# Patient Record
Sex: MALE | Race: WHITE | NOT HISPANIC OR LATINO | Employment: STUDENT | URBAN - METROPOLITAN AREA
[De-identification: names, ages, dates, MRNs, and addresses within clinical notes are randomized per-mention and may not be internally consistent; named-entity substitution may affect disease eponyms.]

---
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
## 2020-09-06 ENCOUNTER — HOSPITAL ENCOUNTER (EMERGENCY)
Facility: HOSPITAL | Age: 20
Discharge: HOME | End: 2020-09-06
Attending: EMERGENCY MEDICINE | Admitting: EMERGENCY MEDICINE
Payer: COMMERCIAL

## 2020-09-06 ENCOUNTER — APPOINTMENT (EMERGENCY)
Dept: RADIOLOGY | Facility: HOSPITAL | Age: 20
End: 2020-09-06
Payer: COMMERCIAL

## 2020-09-06 VITALS
WEIGHT: 218.92 LBS | SYSTOLIC BLOOD PRESSURE: 153 MMHG | RESPIRATION RATE: 18 BRPM | OXYGEN SATURATION: 99 % | HEART RATE: 79 BPM | DIASTOLIC BLOOD PRESSURE: 74 MMHG | TEMPERATURE: 98.4 F

## 2020-09-06 DIAGNOSIS — J06.9 URI (UPPER RESPIRATORY INFECTION): Primary | ICD-10-CM

## 2020-09-06 PROCEDURE — U0003 INFECTIOUS AGENT DETECTION BY NUCLEIC ACID (DNA OR RNA); SEVERE ACUTE RESPIRATORY SYNDROME CORONAVIRUS 2 (SARS-COV-2) (CORONAVIRUS DISEASE [COVID-19]), AMPLIFIED PROBE TECHNIQUE, MAKING USE OF HIGH THROUGHPUT TECHNOLOGIES AS DESCRIBED BY CMS-2020-01-R: HCPCS | Performed by: PHYSICIAN ASSISTANT

## 2020-09-06 PROCEDURE — 99285 EMERGENCY DEPT VISIT HI MDM: CPT

## 2020-09-06 PROCEDURE — 71045 X-RAY EXAM CHEST 1 VIEW: CPT

## 2020-09-06 PROCEDURE — 94640 AIRWAY INHALATION TREATMENT: CPT

## 2020-09-06 PROCEDURE — 99284 EMERGENCY DEPT VISIT MOD MDM: CPT | Performed by: PHYSICIAN ASSISTANT

## 2020-09-06 RX ORDER — LORATADINE 10 MG/1
10 TABLET ORAL DAILY
Qty: 7 TABLET | Refills: 0 | Status: SHIPPED | OUTPATIENT
Start: 2020-09-06 | End: 2020-09-13

## 2020-09-06 RX ORDER — LEVOTHYROXINE SODIUM 0.05 MG/1
50 TABLET ORAL DAILY
COMMUNITY

## 2020-09-06 RX ORDER — LORATADINE 10 MG/1
10 TABLET ORAL ONCE
Status: COMPLETED | OUTPATIENT
Start: 2020-09-06 | End: 2020-09-06

## 2020-09-06 RX ORDER — IPRATROPIUM BROMIDE AND ALBUTEROL SULFATE 2.5; .5 MG/3ML; MG/3ML
3 SOLUTION RESPIRATORY (INHALATION)
Status: DISCONTINUED | OUTPATIENT
Start: 2020-09-06 | End: 2020-09-07 | Stop reason: HOSPADM

## 2020-09-06 RX ORDER — IBUPROFEN 400 MG/1
400 TABLET ORAL EVERY 6 HOURS PRN
Qty: 16 TABLET | Refills: 0 | Status: SHIPPED | OUTPATIENT
Start: 2020-09-06 | End: 2020-09-10

## 2020-09-06 RX ORDER — OXYMETAZOLINE HYDROCHLORIDE 0.05 G/100ML
2 SPRAY NASAL ONCE
Status: COMPLETED | OUTPATIENT
Start: 2020-09-06 | End: 2020-09-06

## 2020-09-06 RX ORDER — IBUPROFEN 400 MG/1
400 TABLET ORAL ONCE
Status: COMPLETED | OUTPATIENT
Start: 2020-09-06 | End: 2020-09-06

## 2020-09-06 RX ORDER — ALBUTEROL SULFATE 90 UG/1
2 AEROSOL, METERED RESPIRATORY (INHALATION) ONCE
Status: DISCONTINUED | OUTPATIENT
Start: 2020-09-06 | End: 2020-09-07 | Stop reason: HOSPADM

## 2020-09-06 RX ADMIN — OXYMETAZOLINE HYDROCHLORIDE 2 SPRAY: 0.05 SPRAY NASAL at 21:51

## 2020-09-06 RX ADMIN — IPRATROPIUM BROMIDE AND ALBUTEROL SULFATE 3 ML: 2.5; .5 SOLUTION RESPIRATORY (INHALATION) at 21:51

## 2020-09-06 RX ADMIN — LORATADINE 10 MG: 10 TABLET ORAL at 21:51

## 2020-09-06 RX ADMIN — IBUPROFEN 400 MG: 400 TABLET ORAL at 21:51

## 2020-09-06 NOTE — Clinical Note
Santy Greta was seen and treated in our emergency department on 9/6/2020  Diagnosis:     Deandre Gilliam  may return to work on return date  He may return on this date: 09/09/2020         If you have any questions or concerns, please don't hesitate to call        Verner Lao, RN    ______________________________           _______________          _______________  Hospital Representative                              Date                                Time

## 2020-09-07 NOTE — DISCHARGE INSTRUCTIONS
Take Motrin, Claritin as indicated  Use Afrin as indicated; 1 spray each naris every 12 hours no longer than 3 days  Perform daily humidifiers  Follow-up with PCP  Follow up emergency department symptoms persist or exacerbate

## 2020-09-07 NOTE — ED PROVIDER NOTES
History  Chief Complaint   Patient presents with    Shortness of Breath     Pt presents to the ED with intermittent episodes of sob and cough throughout the day yesterday  C/o of congestion and dizziness  Patient is a 70-year-old male with no significant past medical surgical history that presents emergency department with intermittent hacking nonproductive cough for 1 day  Patient states that his associated symptomatology of nasal congestion beginning with current ED presentation of cough  Patient also verbalizes a history of tracheomalacia and has been seen by PCP in the past for aforementioned  Patient denies palliative factors with provocative factors of lying flat  Patient affirms not effective treatment of over-the-counter medications  Patient denies fevers, chills, nausea, vomiting, diarrhea, constipation urinary symptoms  Patient denies recent fall or recent trauma  Patient denies sick contacts or recent travel  Patient denies history of DVT, PE, thrombophlebitis  Patient denies recent contact with COVID positive persons  Patient denies chest pain, shortness of breath, and abdominal pain        History provided by:  Patient   used: No    Cough   Cough characteristics:  Non-productive  Sputum characteristics:  Nondescript  Severity:  Mild  Onset quality:  Gradual  Duration:  1 day  Timing:  Intermittent  Progression:  Unchanged  Chronicity:  New  Smoker: yes    Context: not animal exposure, not exposure to allergens, not sick contacts, not smoke exposure, not upper respiratory infection and not weather changes    Relieved by:  Nothing  Worsened by:  Nothing  Ineffective treatments: tylenol cough and sinus   Associated symptoms: sinus congestion    Associated symptoms: no chest pain, no chills, no diaphoresis, no ear fullness, no ear pain, no fever, no headaches, no myalgias, no rash, no rhinorrhea, no shortness of breath, no sore throat and no wheezing        Prior to Admission Medications   Prescriptions Last Dose Informant Patient Reported? Taking?   levothyroxine 50 mcg tablet Not Taking at Unknown time  Yes No   Sig: Take 50 mcg by mouth daily      Facility-Administered Medications: None       History reviewed  No pertinent past medical history  History reviewed  No pertinent surgical history  History reviewed  No pertinent family history  I have reviewed and agree with the history as documented  E-Cigarette/Vaping    E-Cigarette Use Current Some Day User      E-Cigarette/Vaping Substances     Social History     Tobacco Use    Smoking status: Current Every Day Smoker     Packs/day: 0 50    Smokeless tobacco: Former User     Types: Chew   Substance Use Topics    Alcohol use: No    Drug use: No       Review of Systems   Constitutional: Negative for activity change, appetite change, chills, diaphoresis and fever  HENT: Negative for congestion, ear pain, postnasal drip, rhinorrhea, sinus pressure, sinus pain, sore throat and tinnitus  Eyes: Negative for photophobia and visual disturbance  Respiratory: Positive for cough  Negative for chest tightness, shortness of breath and wheezing  Cardiovascular: Negative for chest pain and palpitations  Gastrointestinal: Negative for abdominal pain, constipation, diarrhea, nausea and vomiting  Genitourinary: Negative for difficulty urinating, dysuria, flank pain, frequency and urgency  Musculoskeletal: Negative for back pain, gait problem, myalgias, neck pain and neck stiffness  Skin: Negative for pallor and rash  Allergic/Immunologic: Negative for environmental allergies and food allergies  Neurological: Negative for dizziness, weakness, numbness and headaches  Psychiatric/Behavioral: Negative for confusion  All other systems reviewed and are negative  Physical Exam  Physical Exam  Vitals signs and nursing note reviewed  Constitutional:       General: He is awake        Appearance: Normal appearance  He is well-developed  He is not ill-appearing, toxic-appearing or diaphoretic  Comments: /74   Pulse 79   Temp 98 4 °F (36 9 °C) (Oral)   Resp 18   Wt 99 3 kg (218 lb 14 7 oz)   SpO2 99%      HENT:      Head: Normocephalic and atraumatic  Right Ear: Hearing, tympanic membrane, ear canal and external ear normal  No decreased hearing noted  No drainage, swelling or tenderness  No mastoid tenderness  Left Ear: Hearing, tympanic membrane, ear canal and external ear normal  No decreased hearing noted  No drainage, swelling or tenderness  No mastoid tenderness  Nose: Congestion present  Mouth/Throat:      Lips: Pink  Mouth: Mucous membranes are moist       Pharynx: Oropharynx is clear  Uvula midline  Eyes:      General: Lids are normal  Vision grossly intact  Right eye: No discharge  Left eye: No discharge  Extraocular Movements: Extraocular movements intact  Conjunctiva/sclera: Conjunctivae normal       Pupils: Pupils are equal, round, and reactive to light  Neck:      Musculoskeletal: Full passive range of motion without pain, normal range of motion and neck supple  Normal range of motion  No neck rigidity, spinous process tenderness or muscular tenderness  Vascular: No JVD  Trachea: Trachea and phonation normal  No tracheal tenderness or tracheal deviation  Cardiovascular:      Rate and Rhythm: Normal rate and regular rhythm  Pulses: Normal pulses  Radial pulses are 2+ on the right side and 2+ on the left side  Posterior tibial pulses are 2+ on the right side and 2+ on the left side  Heart sounds: Normal heart sounds  Pulmonary:      Effort: Pulmonary effort is normal       Breath sounds: Normal breath sounds  No stridor  No decreased breath sounds, wheezing, rhonchi or rales  Chest:      Chest wall: No tenderness  Abdominal:      General: Abdomen is flat   Bowel sounds are normal  There is no distension  Palpations: Abdomen is soft  Abdomen is not rigid  Tenderness: There is no abdominal tenderness  There is no guarding or rebound  Musculoskeletal: Normal range of motion  Lymphadenopathy:      Head:      Right side of head: No submental, submandibular, tonsillar, preauricular, posterior auricular or occipital adenopathy  Left side of head: No submental, submandibular, tonsillar, preauricular, posterior auricular or occipital adenopathy  Cervical: No cervical adenopathy  Right cervical: No superficial, deep or posterior cervical adenopathy  Left cervical: No superficial, deep or posterior cervical adenopathy  Skin:     General: Skin is warm  Capillary Refill: Capillary refill takes less than 2 seconds  Neurological:      General: No focal deficit present  Mental Status: He is alert and oriented to person, place, and time  GCS: GCS eye subscore is 4  GCS verbal subscore is 5  GCS motor subscore is 6  Sensory: No sensory deficit  Deep Tendon Reflexes: Reflexes are normal and symmetric  Reflex Scores:       Patellar reflexes are 2+ on the right side and 2+ on the left side  Psychiatric:         Mood and Affect: Mood normal          Speech: Speech normal          Behavior: Behavior normal  Behavior is cooperative  Thought Content:  Thought content normal          Judgment: Judgment normal          Vital Signs  ED Triage Vitals   Temperature Pulse Respirations Blood Pressure SpO2   09/06/20 2036 09/06/20 2037 09/06/20 2037 09/06/20 2037 09/06/20 2037   98 4 °F (36 9 °C) 82 16 (!) 196/113 97 %      Temp Source Heart Rate Source Patient Position - Orthostatic VS BP Location FiO2 (%)   09/06/20 2036 09/06/20 2037 09/06/20 2037 09/06/20 2037 --   Oral Monitor Sitting Right arm       Pain Score       09/06/20 2037       3           Vitals:    09/06/20 2037 09/06/20 2211   BP: (!) 196/113 153/74   Pulse: 82 79   Patient Position - Orthostatic VS: Sitting          Visual Acuity      ED Medications  Medications   ipratropium-albuterol (DUO-NEB) 0 5-2 5 mg/3 mL inhalation solution 3 mL (3 mL Nebulization Given 9/6/20 2151)   albuterol (PROVENTIL HFA,VENTOLIN HFA) inhaler 2 puff (has no administration in time range)   oxymetazoline (AFRIN) 0 05 % nasal spray 2 spray (2 sprays Each Nare Given 9/6/20 2151)   ibuprofen (MOTRIN) tablet 400 mg (400 mg Oral Given 9/6/20 2151)   loratadine (CLARITIN) tablet 10 mg (10 mg Oral Given 9/6/20 2151)       Diagnostic Studies  Results Reviewed     Procedure Component Value Units Date/Time    Novel Coronavirus (COVID-19), PCR [facility] [[id number]] Collected:  09/06/20 2153    Lab Status: In process Specimen:  Nares from Nose Updated:  09/06/20 2158                 XR chest 1 view portable   ED Interpretation by Cheryl Rosen PA-C (09/06 2210)   No acute cardiopulmonary disease on initial read                 Procedures  Procedures         ED Course  ED Course as of Sep 06 2301   Vibha Due Sep 06, 2020   2212 Patient verbalizes decrease in ed presenting cough sx s/p medication delivery              CRAFFT      Most Recent Value   During the past 12 months, did you:   1  Drink any alcohol (more than a few sips)? No Filed at: 09/06/2020 2038   2  Smoke any marijuana or hashish  Yes Filed at: 09/06/2020 2038   3  Use anything else to get high? ("anything else" includes illegal drugs, over the counter and prescription drugs, and things that you sniff or 'hawkins')?   No Filed at: 09/06/2020 2038                                        MDM  Number of Diagnoses or Management Options  URI (upper respiratory infection): new and does not require workup     Amount and/or Complexity of Data Reviewed  Clinical lab tests: ordered  Tests in the radiology section of CPT®: ordered and reviewed  Review and summarize past medical records: yes  Independent visualization of images, tracings, or specimens: yes    Risk of Complications, Morbidity, and/or Mortality  Presenting problems: low  Diagnostic procedures: low  Management options: low    Patient Progress  Patient progress: stable     Patient is a 80-year-old male with no significant past medical surgical history that presents emergency department with intermittent hacking nonproductive cough for 1 day  Patient states that his associated symptomatology of nasal congestion beginning with current ED presentation of cough  Patient also verbalizes a history of tracheomalacia and has been seen by PCP in the past for aforementioned  Patient hemodynamically stable in no SIRS criteria at this time  Chest x-ray with acute cardiopulmonary disease initial read  COVID in process  Delivered Ventolin inhaler in ED  Delivered DuoNeb, Claritin, Afrin and Motrin and patient verbalized decrease in ED presenting coughing symptomatology status post medication delivery  Counseled patient on correct use of Afrin and indicated on discharge instructions  Prescribed Motrin and Claritin and counseled patient medication administration and side effects  Follow-up with PCP in emergency department symptoms persist or exacerbate  Patient demonstrates verbal understanding of all clinical imaging findings, discharge instructions, follow-up and verbalized agreement with current treatment plan  Disposition  Final diagnoses:   URI (upper respiratory infection)     Time reflects when diagnosis was documented in both MDM as applicable and the Disposition within this note     Time User Action Codes Description Comment    9/6/2020 10:24 PM Anayeli Fulton Add [J06 9] URI (upper respiratory infection)       ED Disposition     ED Disposition Condition Date/Time Comment    Discharge Stable Sun Sep 6, 2020 10:24 PM Radha Dinh discharge to home/self care              Follow-up Information     Follow up With Specialties Details Why Contact Info Additional Nallely Hernandez 13, DO Family Medicine Call in 1 week for further evaluation of symptoms [address] [facility] [address] [facility] [address]  [address] [facility] Emergency Department Emergency Medicine Go to  As needed [address] [facility]  AN ED, [address], [place], [place], [address]          Discharge Medication List as of 9/6/2020 10:26 PM      START taking these medications    Details   ibuprofen (MOTRIN) 400 mg tablet Take 1 tablet (400 mg total) by mouth every 6 (six) hours as needed for mild pain for up to 4 days, Starting Sun 9/6/2020, Until Thu 9/10/2020, Normal      loratadine (CLARITIN) 10 mg tablet Take 1 tablet (10 mg total) by mouth daily for 7 days, Starting Sun 9/6/2020, Until Sun 9/13/2020, Normal         CONTINUE these medications which have NOT CHANGED    Details   levothyroxine 50 mcg tablet Take 50 mcg by mouth daily, Historical Med           No discharge procedures on file      PDMP Review     None          ED Provider  Electronically Signed by           Keri Kennedy PA-C  09/06/20 0285

## 2020-09-08 ENCOUNTER — TELEPHONE (OUTPATIENT)
Dept: OTHER | Facility: OTHER | Age: 20
End: 2020-09-08

## 2020-09-08 LAB — SARS-COV-2 RNA SPEC QL NAA+PROBE: NOT DETECTED

## 2020-09-08 NOTE — TELEPHONE ENCOUNTER
The patient was called for notification of a test result for COVID-19  The patient did not answer the phone and a voicemail was left requesting a call back to [phone], Option 7  Advised that test results are available on My Chart

## 2020-10-29 ENCOUNTER — HOSPITAL ENCOUNTER (EMERGENCY)
Facility: HOSPITAL | Age: 20
Discharge: HOME | End: 2020-10-29
Attending: EMERGENCY MEDICINE | Admitting: EMERGENCY MEDICINE
Payer: COMMERCIAL

## 2020-10-29 VITALS
BODY MASS INDEX: 33.04 KG/M2 | DIASTOLIC BLOOD PRESSURE: 98 MMHG | HEART RATE: 73 BPM | TEMPERATURE: 97.4 F | SYSTOLIC BLOOD PRESSURE: 152 MMHG | OXYGEN SATURATION: 100 % | WEIGHT: 218 LBS | RESPIRATION RATE: 18 BRPM | HEIGHT: 68 IN

## 2020-10-29 DIAGNOSIS — Z20.2 STD EXPOSURE: Primary | ICD-10-CM

## 2020-10-29 LAB
BILIRUB UR QL STRIP: NEGATIVE
CLARITY UR: CLEAR
COLOR UR: YELLOW
GLUCOSE UR STRIP-MCNC: NEGATIVE MG/DL
HGB UR QL STRIP.AUTO: NEGATIVE
KETONES UR STRIP-MCNC: NEGATIVE MG/DL
LEUKOCYTE ESTERASE UR QL STRIP: NEGATIVE
NITRITE UR QL STRIP: NEGATIVE
PH UR STRIP.AUTO: 8 [PH]
PROT UR STRIP-MCNC: NEGATIVE MG/DL
SP GR UR STRIP.AUTO: 1.02 (ref 1–1.03)
UROBILINOGEN UR QL STRIP.AUTO: 0.2 E.U./DL

## 2020-10-29 PROCEDURE — 81003 URINALYSIS AUTO W/O SCOPE: CPT | Performed by: EMERGENCY MEDICINE

## 2020-10-29 PROCEDURE — 99283 EMERGENCY DEPT VISIT LOW MDM: CPT

## 2020-10-29 PROCEDURE — 87491 CHLMYD TRACH DNA AMP PROBE: CPT | Performed by: EMERGENCY MEDICINE

## 2020-10-29 PROCEDURE — 99282 EMERGENCY DEPT VISIT SF MDM: CPT | Performed by: EMERGENCY MEDICINE

## 2020-10-29 PROCEDURE — 87591 N.GONORRHOEAE DNA AMP PROB: CPT | Performed by: EMERGENCY MEDICINE

## 2020-10-29 PROCEDURE — 96372 THER/PROPH/DIAG INJ SC/IM: CPT

## 2020-10-29 RX ORDER — ONDANSETRON 4 MG/1
4 TABLET, ORALLY DISINTEGRATING ORAL ONCE
Status: COMPLETED | OUTPATIENT
Start: 2020-10-29 | End: 2020-10-29

## 2020-10-29 RX ORDER — AZITHROMYCIN 500 MG/1
1000 TABLET, FILM COATED ORAL ONCE
Status: COMPLETED | OUTPATIENT
Start: 2020-10-29 | End: 2020-10-29

## 2020-10-29 RX ADMIN — ONDANSETRON 4 MG: 4 TABLET, ORALLY DISINTEGRATING ORAL at 18:04

## 2020-10-29 RX ADMIN — AZITHROMYCIN 1000 MG: 500 TABLET, FILM COATED ORAL at 18:04

## 2020-10-29 RX ADMIN — LIDOCAINE HYDROCHLORIDE 250 MG: 10 INJECTION, SOLUTION EPIDURAL; INFILTRATION; INTRACAUDAL; PERINEURAL at 18:05

## 2020-10-30 LAB
C TRACH DNA SPEC QL NAA+PROBE: NEGATIVE
N GONORRHOEA DNA SPEC QL NAA+PROBE: NEGATIVE

## 2021-06-02 ENCOUNTER — TELEPHONE (OUTPATIENT)
Dept: FAMILY MEDICINE CLINIC | Facility: CLINIC | Age: 21
End: 2021-06-02

## 2021-06-22 NOTE — TELEPHONE ENCOUNTER
06/22/21 4:21 PM     Thank you for your request  Your request has been received, reviewed, and the patient chart updated  The PCP has successfully been removed with a patient attribution note  This message will now be completed      Thank you  Shelia Hollis

## 2025-02-03 ENCOUNTER — APPOINTMENT (EMERGENCY)
Dept: RADIOLOGY | Facility: HOSPITAL | Age: 25
End: 2025-02-03
Payer: COMMERCIAL

## 2025-02-03 ENCOUNTER — HOSPITAL ENCOUNTER (EMERGENCY)
Facility: HOSPITAL | Age: 25
Discharge: HOME | End: 2025-02-03
Attending: EMERGENCY MEDICINE
Payer: COMMERCIAL

## 2025-02-03 VITALS
OXYGEN SATURATION: 100 % | DIASTOLIC BLOOD PRESSURE: 66 MMHG | HEART RATE: 89 BPM | TEMPERATURE: 98 F | SYSTOLIC BLOOD PRESSURE: 164 MMHG | RESPIRATION RATE: 18 BRPM

## 2025-02-03 DIAGNOSIS — S61.112A LACERATION OF LEFT THUMB WITHOUT FOREIGN BODY WITH DAMAGE TO NAIL, INITIAL ENCOUNTER: Primary | ICD-10-CM

## 2025-02-03 PROCEDURE — 73140 X-RAY EXAM OF FINGER(S): CPT

## 2025-02-03 PROCEDURE — 64450 NJX AA&/STRD OTHER PN/BRANCH: CPT | Performed by: EMERGENCY MEDICINE

## 2025-02-03 PROCEDURE — 99283 EMERGENCY DEPT VISIT LOW MDM: CPT

## 2025-02-03 PROCEDURE — 90471 IMMUNIZATION ADMIN: CPT

## 2025-02-03 PROCEDURE — 99284 EMERGENCY DEPT VISIT MOD MDM: CPT | Performed by: EMERGENCY MEDICINE

## 2025-02-03 PROCEDURE — 90715 TDAP VACCINE 7 YRS/> IM: CPT | Performed by: EMERGENCY MEDICINE

## 2025-02-03 RX ORDER — LIDOCAINE HYDROCHLORIDE 10 MG/ML
5 INJECTION, SOLUTION EPIDURAL; INFILTRATION; INTRACAUDAL; PERINEURAL ONCE
Status: COMPLETED | OUTPATIENT
Start: 2025-02-03 | End: 2025-02-03

## 2025-02-03 RX ORDER — GINSENG 100 MG
1 CAPSULE ORAL 2 TIMES DAILY
Qty: 28 G | Refills: 0 | Status: SHIPPED | OUTPATIENT
Start: 2025-02-03

## 2025-02-03 RX ORDER — IBUPROFEN 400 MG/1
400 TABLET, FILM COATED ORAL ONCE
Status: COMPLETED | OUTPATIENT
Start: 2025-02-03 | End: 2025-02-03

## 2025-02-03 RX ORDER — ACETAMINOPHEN 325 MG/1
650 TABLET ORAL ONCE
Status: COMPLETED | OUTPATIENT
Start: 2025-02-03 | End: 2025-02-03

## 2025-02-03 RX ADMIN — ACETAMINOPHEN 650 MG: 325 TABLET, FILM COATED ORAL at 15:02

## 2025-02-03 RX ADMIN — IBUPROFEN 400 MG: 400 TABLET, FILM COATED ORAL at 15:02

## 2025-02-03 RX ADMIN — LIDOCAINE HYDROCHLORIDE 5 ML: 10 INJECTION, SOLUTION EPIDURAL; INFILTRATION; INTRACAUDAL at 15:01

## 2025-02-03 RX ADMIN — TETANUS TOXOID, REDUCED DIPHTHERIA TOXOID AND ACELLULAR PERTUSSIS VACCINE, ADSORBED 0.5 ML: 5; 2.5; 8; 8; 2.5 SUSPENSION INTRAMUSCULAR at 15:07

## 2025-02-03 NOTE — DISCHARGE INSTRUCTIONS
You were evaluated in the emergency department for a thumb laceration.  It is important that you keep the wound covered for the next several days.  Return to the emergency department if you notice increased redness, swelling, drainage from the wound.  We have provided you with a prescription for antibacterial ointment.

## 2025-02-03 NOTE — Clinical Note
Chidi Torres was seen and treated in our emergency department on 2/3/2025.            Light duty    Diagnosis: Left thumb laceration    Chidi  .    He may return on this date: 02/04/2025    Chidi may have light duty for the next week due to his thumb injury for proper wound healing     If you have any questions or concerns, please don't hesitate to call.      Eleazar Mitchell, DO    ______________________________           _______________          _______________  Hospital Representative                              Date                                Time

## 2025-02-03 NOTE — ED PROVIDER NOTES
Time reflects when diagnosis was documented in both MDM as applicable and the Disposition within this note       Time User Action Codes Description Comment    2/3/2025  3:47 PM Eleazar Mitchell Add [S61.112A] Laceration of left thumb without foreign body with damage to nail, initial encounter           ED Disposition       ED Disposition   Discharge    Condition   Stable    Date/Time   Mon Feb 3, 2025  3:47 PM    Comment   Chidi Torres discharge to home/self care.                   Assessment & Plan       Medical Decision Making  24-year-old male presents ED for evaluation of left thumb injury.  Left thumb with full strength.  Differential diagnosis: Rule out foreign body or fracture.  Plan: X-ray thumb to rule out fracture.  Ring block performed for local anesthesia.  Patient tolerated procedure well.  Pain, back pain  Thumb x-ray negative for fracture.  Patient stable for discharge.  Strict return precautions discussed with patient.  Bacitracin sent to the patient's pharmacy.    Amount and/or Complexity of Data Reviewed  Radiology: ordered. Decision-making details documented in ED Course.    Risk  OTC drugs.  Prescription drug management.        ED Course as of 02/03/25 1555   Mon Feb 03, 2025   1535 XR thumb first digit-min 2 views LEFT  No fracture per my interpretation       Medications   ibuprofen (MOTRIN) tablet 400 mg (400 mg Oral Given 2/3/25 1502)   acetaminophen (TYLENOL) tablet 650 mg (650 mg Oral Given 2/3/25 1502)   tetanus-diphtheria-acellular pertussis (BOOSTRIX) IM injection 0.5 mL (0.5 mL Intramuscular Given 2/3/25 1507)   lidocaine (PF) (XYLOCAINE-MPF) 1 % injection 5 mL (5 mL Infiltration Given by Other 2/3/25 1501)       ED Risk Strat Scores                          SBIRT 22yo+      Flowsheet Row Most Recent Value   Initial Alcohol Screen: US AUDIT-C     1. How often do you have a drink containing alcohol? 0 Filed at: 02/03/2025 1400   2. How many drinks containing alcohol do you have on a typical  day you are drinking?  0 Filed at: 02/03/2025 1400   3a. Male UNDER 65: How often do you have five or more drinks on one occasion? 0 Filed at: 02/03/2025 1400   3b. FEMALE Any Age, or MALE 65+: How often do you have 4 or more drinks on one occassion? 0 Filed at: 02/03/2025 1400   Audit-C Score 0 Filed at: 02/03/2025 1400   EDUIN: How many times in the past year have you...    Used an illegal drug or used a prescription medication for non-medical reasons? Never Filed at: 02/03/2025 1400                            History of Present Illness       Chief Complaint   Patient presents with    Thumb Injury     Pt cut tip of left thumb with axe. Minor bleeding controlled in triage, bandage applied.        Past Medical History:   Diagnosis Date    Disease of thyroid gland       History reviewed. No pertinent surgical history.   History reviewed. No pertinent family history.   Social History     Tobacco Use    Smoking status: Every Day     Current packs/day: 0.50     Types: Cigarettes    Smokeless tobacco: Former     Types: Chew   Vaping Use    Vaping status: Some Days   Substance Use Topics    Alcohol use: No    Drug use: No      E-Cigarette/Vaping    E-Cigarette Use Current Some Day User       E-Cigarette/Vaping Substances      I have reviewed and agree with the history as documented.     24-year-old male who presents ED for evaluation of left thumb injury.  Patient states he was chopping wood just prior to arrival with an ax when he could because of his thumb.  He states that he is not sure if he is up-to-date on his tetanus immunization.  He states that his fingertip is very painful.  On arrival, lesion actively bleeding.        Review of Systems   Respiratory:  Negative for shortness of breath.    Cardiovascular:  Negative for chest pain.   Skin:  Positive for wound.   Neurological:  Negative for weakness.           Objective       ED Triage Vitals [02/03/25 1400]   Temperature Pulse Blood Pressure Respirations SpO2  Patient Position - Orthostatic VS   98 °F (36.7 °C) 89 164/66 18 100 % --      Temp src Heart Rate Source BP Location FiO2 (%) Pain Score    -- Monitor -- -- --      Vitals      Date and Time Temp Pulse SpO2 Resp BP Pain Score FACES Pain Rating User   02/03/25 1400 98 °F (36.7 °C) 89 100 % 18 164/66 -- -- JR            Physical Exam  Vitals and nursing note reviewed.   Constitutional:       General: He is not in acute distress.     Appearance: Normal appearance. He is normal weight. He is not ill-appearing, toxic-appearing or diaphoretic.   HENT:      Head: Normocephalic and atraumatic.      Nose: No congestion.      Mouth/Throat:      Mouth: Mucous membranes are moist.   Eyes:      Conjunctiva/sclera: Conjunctivae normal.   Cardiovascular:      Rate and Rhythm: Normal rate.   Pulmonary:      Effort: Pulmonary effort is normal.   Musculoskeletal:      Cervical back: Neck supple.      Comments: Flexion and extension intact in the left thumb   Skin:     General: Skin is warm and dry.      Capillary Refill: Capillary refill takes less than 2 seconds.      Comments: Left thumb fingertip with avulsion which is actively bleeding.   Neurological:      Mental Status: He is alert.         Results Reviewed       None            XR thumb first digit-min 2 views LEFT    (Results Pending)       Digital Block    Date/Time: 2/3/2025 3:23 PM    Performed by: Eleazar Mitchell DO  Authorized by: Eleazar Mitchell DO    Consent:     Consent obtained:  Verbal    Consent given by:  Patient    Alternatives discussed:  No treatment  Indications:     Indications:  Pain relief  Location:     Block location:  Finger    Finger blocked:  L thumb  Pre-procedure details:     Neurovascular status: intact      Skin preparation:  Alcohol  Procedure details (see MAR for exact dosages):     Needle gauge:  24 G    Anesthetic injected:  Lidocaine 1% w/o epi    Technique:  Three-sided block    Injection procedure:  Anatomic landmarks identified, incremental  injection, negative aspiration for blood, anatomic landmarks palpated and introduced needle  Post-procedure details:     Outcome:  Anesthesia achieved    Patient tolerance of procedure:  Tolerated well, no immediate complications      ED Medication and Procedure Management   Prior to Admission Medications   Prescriptions Last Dose Informant Patient Reported? Taking?   ibuprofen (MOTRIN) 400 mg tablet   No No   Sig: Take 1 tablet (400 mg total) by mouth every 6 (six) hours as needed for mild pain for up to 4 days   levothyroxine 50 mcg tablet   Yes No   Sig: Take 50 mcg by mouth daily   loratadine (CLARITIN) 10 mg tablet   No No   Sig: Take 1 tablet (10 mg total) by mouth daily for 7 days      Facility-Administered Medications: None     Discharge Medication List as of 2/3/2025  3:50 PM        START taking these medications    Details   bacitracin topical ointment 500 units/g topical ointment Apply 1 large application topically 2 (two) times a day, Starting Mon 2/3/2025, Normal           CONTINUE these medications which have NOT CHANGED    Details   ibuprofen (MOTRIN) 400 mg tablet Take 1 tablet (400 mg total) by mouth every 6 (six) hours as needed for mild pain for up to 4 days, Starting Sun 9/6/2020, Until u 9/10/2020, Normal      levothyroxine 50 mcg tablet Take 50 mcg by mouth daily, Historical Med      loratadine (CLARITIN) 10 mg tablet Take 1 tablet (10 mg total) by mouth daily for 7 days, Starting Sun 9/6/2020, Until Sun 9/13/2020, Normal           No discharge procedures on file.  ED SEPSIS DOCUMENTATION   Time reflects when diagnosis was documented in both MDM as applicable and the Disposition within this note       Time User Action Codes Description Comment    2/3/2025  3:47 PM Eleazar Mitchell Add [S61.112A] Laceration of left thumb without foreign body with damage to nail, initial encounter                  Eleazar Mitchell DO  02/03/25 9656

## 2025-02-03 NOTE — Clinical Note
Chidi Torres was seen and treated in our emergency department on 2/3/2025.            Light duty    Diagnosis: Left thumb laceration    Chidi  .    He may return on this date: 02/04/2025    Chidi may have light duty for the next week due to his thumb injury for proper wound healing. 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 He may perform tasks such as shred paper, stock, inventory, computer tasks, customer service.      If you have any questions or concerns, please don't hesitate to call.      Eleazar Mitchell, DO    ______________________________           _______________          _______________  Hospital Representative                              Date                                Time

## 2025-02-04 NOTE — ED ATTENDING ATTESTATION
I saw and evaluated the patient. I have discussed the patient with the resident physician and agree with the resident's findings, assessment and plan as documented in the resident physician's note, unless otherwise documented below. All available laboratory and imaging studies were reviewed by myself.  I was present for key portions of any procedure(s) performed by the resident and I was immediately available to provide assistance.     I agree with the current assessment done in the Emergency Department. I have conducted an independent evaluation of this patient    Final Diagnosis:  1. Laceration of left thumb without foreign body with damage to nail, initial encounter            Chief Complaint   Patient presents with    Thumb Injury     Pt cut tip of left thumb with axe. Minor bleeding controlled in triage, bandage applied.      This is a 24 y.o. male presenting for evaluation of left thumb injury. Patient accidentally sustained laceration to his left thumb with an axe. Bleed controlled. Some numbness localized to wound. No other injuries.     PMH:   has a past medical history of Disease of thyroid gland.    PSH:   has no past surgical history on file.    Social:  Social History     Substance and Sexual Activity   Alcohol Use No     Social History     Tobacco Use   Smoking Status Every Day    Current packs/day: 0.50    Types: Cigarettes   Smokeless Tobacco Former    Types: Chew     Social History     Substance and Sexual Activity   Drug Use No     PE:  Vitals:    02/03/25 1400   BP: 164/66   Pulse: 89   Resp: 18   Temp: 98 °F (36.7 °C)   SpO2: 100%         Physical exam:  GENERAL APPEARANCE: Appears comfortable, no acute distress, calm and cooperative   NEURO: GCS 15, no gross focal deficits   HENT: Normocephalic, atraumatic  Eyes: EOMI, normal pupil size   Neck: Full ROM  CV: Warm, well perfused  LUNGS: No respiratory distress  MSK: Gaping laceration to distal left thumb  M/U/R/A nerve sensation intact.   Finger  abduction/adduction/opposition intact.   Able to 1+2 and 1+5 finger appose.   Able to 2+3 finger cross.   Normal ROM  SKIN: Warm and dry        Assessment and plan: This is a 24 y.o. male presenting for evaluation of left thumb injury. Wound too gaping for closure. Will allow closure by secondary intention and apply dressing. Update patient on TDAP. Obtain Xray to rule out fx.          Final assessment: Xrays negative per my interpretation, pending official radiology read. Explained that they will be notified for discrepancies with radiology read. Strict ED return precautions provided should symptoms worsen and patient can otherwise follow up outpatient. Patient expresses an understanding and agreement with the plan and remains in good condition for discharge.       Code Status: No Order  Advance Directive and Living Will:      Power of :    POLST:      Medications   ibuprofen (MOTRIN) tablet 400 mg (400 mg Oral Given 2/3/25 1502)   acetaminophen (TYLENOL) tablet 650 mg (650 mg Oral Given 2/3/25 1502)   tetanus-diphtheria-acellular pertussis (BOOSTRIX) IM injection 0.5 mL (0.5 mL Intramuscular Given 2/3/25 1507)   lidocaine (PF) (XYLOCAINE-MPF) 1 % injection 5 mL (5 mL Infiltration Given by Other 2/3/25 1501)     XR thumb first digit-min 2 views LEFT   Final Result      Laceration at the tip of the thumb without a radiopaque foreign body or fracture.         Computerized Assisted Algorithm (CAA) may have been used to analyze all applicable images.               Workstation performed: [id number]           Orders Placed This Encounter   Procedures    Digital Block    XR thumb first digit-min 2 views LEFT     Labs Reviewed - No data to display      Time reflects when diagnosis was documented in both MDM as applicable and the Disposition within this note       Time User Action Codes Description Comment    2/3/2025  3:47 PM Eleazar Mitchell Add [S61.112A] Laceration of left thumb without foreign body with damage to  "nail, initial encounter           ED Disposition       ED Disposition   Discharge    Condition   Stable    Date/Time   Mon Feb 3, 2025  3:47 PM    Comment   Chidi Torres discharge to home/self care.                   Follow-up Information    None       Discharge Medication List as of 2/3/2025  3:50 PM        START taking these medications    Details   bacitracin topical ointment 500 units/g topical ointment Apply 1 large application topically 2 (two) times a day, Starting Mon 2/3/2025, Normal           CONTINUE these medications which have NOT CHANGED    Details   ibuprofen (MOTRIN) 400 mg tablet Take 1 tablet (400 mg total) by mouth every 6 (six) hours as needed for mild pain for up to 4 days, Starting Sun 9/6/2020, Until Thu 9/10/2020, Normal      levothyroxine 50 mcg tablet Take 50 mcg by mouth daily, Historical Med      loratadine (CLARITIN) 10 mg tablet Take 1 tablet (10 mg total) by mouth daily for 7 days, Starting Sun 9/6/2020, Until Sun 9/13/2020, Normal           No discharge procedures on file.  Prior to Admission Medications   Prescriptions Last Dose Informant Patient Reported? Taking?   ibuprofen (MOTRIN) 400 mg tablet   No No   Sig: Take 1 tablet (400 mg total) by mouth every 6 (six) hours as needed for mild pain for up to 4 days   levothyroxine 50 mcg tablet   Yes No   Sig: Take 50 mcg by mouth daily   loratadine (CLARITIN) 10 mg tablet   No No   Sig: Take 1 tablet (10 mg total) by mouth daily for 7 days      Facility-Administered Medications: None         Portions of the record may have been created with voice recognition software. Occasional wrong word or \"sound a like\" substitutions may have occurred due to the inherent limitations of voice recognition software. Read the chart carefully and recognize, using context, where substitutions have occurred.    Electronically signed by:  Chaya Balderrama    "

## 2025-07-25 ENCOUNTER — HOSPITAL ENCOUNTER (EMERGENCY)
Facility: HOSPITAL | Age: 25
Discharge: HOME | End: 2025-07-25
Attending: STUDENT IN AN ORGANIZED HEALTH CARE EDUCATION/TRAINING PROGRAM
Payer: COMMERCIAL

## 2025-07-25 VITALS
OXYGEN SATURATION: 98 % | RESPIRATION RATE: 18 BRPM | HEART RATE: 76 BPM | TEMPERATURE: 99.3 F | SYSTOLIC BLOOD PRESSURE: 146 MMHG | DIASTOLIC BLOOD PRESSURE: 79 MMHG

## 2025-07-25 DIAGNOSIS — K12.2 UVULITIS: Primary | ICD-10-CM

## 2025-07-25 PROCEDURE — 99282 EMERGENCY DEPT VISIT SF MDM: CPT

## 2025-07-25 PROCEDURE — 99284 EMERGENCY DEPT VISIT MOD MDM: CPT | Performed by: STUDENT IN AN ORGANIZED HEALTH CARE EDUCATION/TRAINING PROGRAM

## 2025-07-25 RX ORDER — DEXAMETHASONE 4 MG/1
10 TABLET ORAL ONCE
Qty: 3 TABLET | Refills: 0 | Status: SHIPPED | OUTPATIENT
Start: 2025-07-27 | End: 2025-07-27

## 2025-07-25 RX ORDER — CLINDAMYCIN HYDROCHLORIDE 150 MG/1
300 CAPSULE ORAL EVERY 8 HOURS SCHEDULED
Qty: 28 CAPSULE | Refills: 0 | Status: SHIPPED | OUTPATIENT
Start: 2025-07-25 | End: 2025-07-25

## 2025-07-25 RX ORDER — LIDOCAINE HYDROCHLORIDE 20 MG/ML
15 SOLUTION OROPHARYNGEAL 3 TIMES DAILY PRN
Qty: 100 ML | Refills: 0 | Status: SHIPPED | OUTPATIENT
Start: 2025-07-25

## 2025-07-25 RX ORDER — LIDOCAINE HYDROCHLORIDE 20 MG/ML
15 SOLUTION OROPHARYNGEAL 3 TIMES DAILY PRN
Qty: 100 ML | Refills: 0 | Status: SHIPPED | OUTPATIENT
Start: 2025-07-25 | End: 2025-07-25

## 2025-07-25 RX ORDER — CLINDAMYCIN HYDROCHLORIDE 150 MG/1
300 CAPSULE ORAL ONCE
Status: COMPLETED | OUTPATIENT
Start: 2025-07-25 | End: 2025-07-25

## 2025-07-25 RX ORDER — LIDOCAINE HYDROCHLORIDE 20 MG/ML
15 SOLUTION OROPHARYNGEAL ONCE
Status: COMPLETED | OUTPATIENT
Start: 2025-07-25 | End: 2025-07-25

## 2025-07-25 RX ORDER — ACETAMINOPHEN 325 MG/1
650 TABLET ORAL ONCE
Status: COMPLETED | OUTPATIENT
Start: 2025-07-25 | End: 2025-07-25

## 2025-07-25 RX ORDER — CLINDAMYCIN HYDROCHLORIDE 150 MG/1
300 CAPSULE ORAL EVERY 8 HOURS SCHEDULED
Qty: 28 CAPSULE | Refills: 0 | Status: SHIPPED | OUTPATIENT
Start: 2025-07-25 | End: 2025-08-04

## 2025-07-25 RX ADMIN — DEXAMETHASONE 10 MG: 2 TABLET ORAL at 14:56

## 2025-07-25 RX ADMIN — CLINDAMYCIN HYDROCHLORIDE 300 MG: 150 CAPSULE ORAL at 14:56

## 2025-07-25 RX ADMIN — ACETAMINOPHEN 650 MG: 325 TABLET ORAL at 14:56

## 2025-07-25 RX ADMIN — LIDOCAINE HYDROCHLORIDE 15 ML: 20 SOLUTION ORAL at 14:56

## 2025-07-25 NOTE — ED PROVIDER NOTES
Time reflects when diagnosis was documented in both MDM as applicable and the Disposition within this note       Time User Action Codes Description Comment    7/25/2025  3:24 PM Peggy Ram [K12.2] Uvulitis           ED Disposition       ED Disposition   Discharge    Condition   Stable    Date/Time   Fri Jul 25, 2025  3:24 PM    Comment   Chidi Torres discharge to home/self care.                   Assessment & Plan       Medical Decision Making  24-year-old male with a history of Goodwin's esophagus who is presenting with worsening swelling in his mouth, sore throat, trouble swallowing since this morning.     VSS. On exam, patient is well-appearing. Lungs clear, heart rrr. Oropharynx with erythema, and uvula swelling. No exudates, petechiae. No evidence of abscess or cervical lymphadenopathy.  Ear exam normal.  No neck stiffness.    Likely uvulitis given swelling of uvula on exam.  No tonsillar swelling or abscesses visualized, low suspicion for a PTA at this time.  Low suspicion for retropharyngeal abscess, Arnold's angina, tracheitis.  Patient was tested at urgent care for strep and it was negative.  Will provide patient with viscous lidocaine, Tylenol Decadron, and first dose of clindamycin.  Plan to p.o. trial patient and discharge home with antibiotics and primary care follow-up.  Discussed with patient instructions on how to take all the medications.  Also provided extensive return precautions if patient is experiencing trouble breathing, worsening symptoms to return to the emergency room.    Risk  OTC drugs.  Prescription drug management.             Medications   Lidocaine Viscous HCl (XYLOCAINE) 2 % mucosal solution 15 mL (15 mL Swish & Swallow Given 7/25/25 1456)   dexamethasone (DECADRON) tablet 10 mg (10 mg Oral Given 7/25/25 1456)   acetaminophen (TYLENOL) tablet 650 mg (650 mg Oral Given 7/25/25 1456)   clindamycin (CLEOCIN) capsule 300 mg (300 mg Oral Given 7/25/25 1456)       ED Risk Strat  Scores                    No data recorded        SBIRT 22yo+      Flowsheet Row Most Recent Value   Initial Alcohol Screen: US AUDIT-C     1. How often do you have a drink containing alcohol? 0 Filed at: 07/25/2025 1324   2. How many drinks containing alcohol do you have on a typical day you are drinking?  0 Filed at: 07/25/2025 1324   3a. Male UNDER 65: How often do you have five or more drinks on one occasion? 0 Filed at: 07/25/2025 1324   3b. FEMALE Any Age, or MALE 65+: How often do you have 4 or more drinks on one occassion? 0 Filed at: 07/25/2025 1324   Audit-C Score 0 Filed at: 07/25/2025 1324   EDUIN: How many times in the past year have you...    Used an illegal drug or used a prescription medication for non-medical reasons? Never Filed at: 07/25/2025 1324                            History of Present Illness       Chief Complaint   Patient presents with    URI     Pt was seen at [facility] urgent care and sent here for tonsil swelling.  Pt states he started with cough, throat pain, and congestion yesterday       Past Medical History[1]   Past Surgical History[2]   Family History[3]   Social History[4]   E-Cigarette/Vaping    E-Cigarette Use Current Some Day User       E-Cigarette/Vaping Substances      I have reviewed and agree with the history as documented.     24-year-old male with a history of Goodwin's esophagus who is presenting with worsening swelling in his mouth, sore throat, trouble swallowing since this morning.  Patient reports he had a runny nose cough she also congestion, ear pain.  Patient took Advil last night with mild symptom relief.  Of note patient was sent in from urgent care, where he tested negative for strep A and was told to come to the emergency room for further evaluation.  He denies fever/chills, n/v, cp, sob, abd pain, recent travel, sick contacts.      History provided by:  Patient   used: No    Sore Throat  Location:  Generalized  Severity:  Mild  Onset quality:   Sudden  Duration:  1 day  Timing:  Constant  Progression:  Worsening  Chronicity:  New  Relieved by:  Nothing  Worsened by:  Swallowing  Ineffective treatments:  None tried  Associated symptoms: ear pain, trouble swallowing and voice change    Associated symptoms: no abdominal pain, no chest pain, no chills, no fever, no headaches, no rash and no shortness of breath        Review of Systems   Constitutional:  Negative for chills and fever.   HENT:  Positive for ear pain, sore throat, trouble swallowing and voice change.    Respiratory:  Negative for shortness of breath.    Cardiovascular:  Negative for chest pain.   Gastrointestinal:  Negative for abdominal pain, nausea and vomiting.   Skin:  Negative for rash.   Neurological:  Negative for headaches.   All other systems reviewed and are negative.          Objective       ED Triage Vitals [07/25/25 1322]   Temperature Pulse Blood Pressure Respirations SpO2 Patient Position - Orthostatic VS   99.3 °F (37.4 °C) 76 146/79 18 98 % Sitting      Temp Source Heart Rate Source BP Location FiO2 (%) Pain Score    Temporal Monitor Left arm -- 7      Vitals      Date and Time Temp Pulse SpO2 Resp BP Pain Score FACES Pain Rating User   07/25/25 1322 99.3 °F (37.4 °C) 76 98 % 18 146/79 7 -- KRR            Physical Exam  Vitals reviewed.   Constitutional:       Appearance: Normal appearance.   HENT:      Head: Normocephalic and atraumatic.      Right Ear: Hearing and tympanic membrane normal. No foreign body. Tympanic membrane is not perforated or bulging.      Left Ear: Hearing and tympanic membrane normal. No foreign body. Tympanic membrane is not perforated or bulging.      Mouth/Throat:      Pharynx: Posterior oropharyngeal erythema and uvula swelling present. No oropharyngeal exudate.      Comments: No submental or sublingual swelling  No cervical lymphadenopathy  No petechiae on posterior oropharynx  No exudates  No tonsillar swelling    Cardiovascular:      Rate and Rhythm:  Normal rate and regular rhythm.      Heart sounds: No murmur heard.     No friction rub. No gallop.   Pulmonary:      Effort: Pulmonary effort is normal.      Breath sounds: Normal breath sounds. No wheezing, rhonchi or rales.   Abdominal:      General: Abdomen is flat.     Musculoskeletal:         General: Normal range of motion.      Cervical back: Normal range of motion and neck supple.     Skin:     General: Skin is warm and dry.     Neurological:      General: No focal deficit present.      Mental Status: He is alert and oriented to person, place, and time.     Psychiatric:         Mood and Affect: Mood normal.         Behavior: Behavior normal.         Results Reviewed       None            No orders to display       Procedures    ED Medication and Procedure Management   Prior to Admission Medications   Prescriptions Last Dose Informant Patient Reported? Taking?   bacitracin topical ointment 500 units/g topical ointment   No No   Sig: Apply 1 large application topically 2 (two) times a day   ibuprofen (MOTRIN) 400 mg tablet   No No   Sig: Take 1 tablet (400 mg total) by mouth every 6 (six) hours as needed for mild pain for up to 4 days   levothyroxine 50 mcg tablet   Yes No   Sig: Take 50 mcg by mouth daily   loratadine (CLARITIN) 10 mg tablet   No No   Sig: Take 1 tablet (10 mg total) by mouth daily for 7 days      Facility-Administered Medications: None     Discharge Medication List as of 7/25/2025  3:37 PM        START taking these medications    Details   clindamycin (CLEOCIN) 150 mg capsule Take 2 capsules (300 mg total) by mouth every 8 (eight) hours for 10 days, Starting Fri 7/25/2025, Until Mon 8/4/2025, Normal      dexamethasone 20 mg TABS Take 10 mg by mouth 1 (one) time for 1 dose Do not start before July 27, 2025., Starting Sun 7/27/2025, Normal      Lidocaine Viscous HCl (XYLOCAINE) 2 % mucosal solution Swish and spit 15 mL 3 (three) times a day as needed for mouth pain or discomfort, Starting Fri  7/25/2025, Normal           CONTINUE these medications which have NOT CHANGED    Details   bacitracin topical ointment 500 units/g topical ointment Apply 1 large application topically 2 (two) times a day, Starting Mon 2/3/2025, Normal      ibuprofen (MOTRIN) 400 mg tablet Take 1 tablet (400 mg total) by mouth every 6 (six) hours as needed for mild pain for up to 4 days, Starting Sun 9/6/2020, Until Thu 9/10/2020, Normal      levothyroxine 50 mcg tablet Take 50 mcg by mouth daily, Historical Med      loratadine (CLARITIN) 10 mg tablet Take 1 tablet (10 mg total) by mouth daily for 7 days, Starting Sun 9/6/2020, Until Sun 9/13/2020, Normal           No discharge procedures on file.  ED SEPSIS DOCUMENTATION   Time reflects when diagnosis was documented in both MDM as applicable and the Disposition within this note       Time User Action Codes Description Comment    7/25/2025  3:24 PM Peggy Ram Add [K12.2] Uvulitis                      [1]   Past Medical History:  Diagnosis Date    Disease of thyroid gland    [2] No past surgical history on file.  [3] No family history on file.  [4]   Social History  Tobacco Use    Smoking status: Every Day     Current packs/day: 0.50     Types: Cigarettes    Smokeless tobacco: Former     Types: Chew   Vaping Use    Vaping status: Some Days   Substance Use Topics    Alcohol use: Yes    Drug use: No        Peggy Ram DO  07/25/25 8346

## 2025-07-25 NOTE — ED ATTENDING ATTESTATION
Final Diagnoses:     1. Uvulitis           Jake MITCHELL DO, saw and evaluated the patient. All available labs and X-rays were ordered by me or the resident / non-physician and have been reviewed by myself. I discussed the patient with the resident / non-physician and agree with the resident's / non-physician practitioner's findings and plan as documented in the resident's / non-physician practicitioner's note, except where noted.   At this point, I agree with the current assessment done in the ED.   I was present during key portions of all procedures performed unless otherwise stated.     Nursing Triage:     Chief Complaint   Patient presents with    URI     Pt was seen at [facility] urgent care and sent here for tonsil swelling.  Pt states he started with cough, throat pain, and congestion yesterday       HPI:   This is a 24 y.o. male presenting for evaluation of sore throat. Hx of morataya's. Sore throat since this AM. Feels swelling. Had some viral URI symptoms yesterday. Took advil last night without relief. Can tolerate PO. No fevers, chills, NVD. Went to urgent care prior to arrival, got swab, negative. Sent here for tonsillar swelling    ASSESSMENT + PLAN:   Patient is a 24 y.o. male who presents to the ED for throat pain.  Patient is nontoxic, well-appearing.     Differential includes but is not limited to: uvulitis. Presentation not consistent with epiglotitis, RPA, PTA.     Plan: symtpomatic treatment, steroids, abx, d/c with return precautions                 Physical:   Pertinent: Uvula swelling. Midline. No pooled secretions. No stridor. Submandibular area is soft. Neck is supple.     General: VS reviewed  Appears in NAD  awake, alert.   Well-nourished, well-developed. Appears stated age.   Speaking normally in full sentences.   Head: Normocephalic, atraumatic  Eyes: EOM-I. No diplopia.   No hyphema.   No subconjunctival hemorrhages.  Symmetrical lids.   ENT: Atraumatic external nose and ears.    MMM  No  malocclusion. No stridor. Normal phonation. No drooling. Normal swallowing.   Neck: No JVD.  CV: No pallor noted  Lungs:   No tachypnea  No respiratory distress  MSK:   FROM spontaneously  Skin: Dry, intact.   Neuro: Awake, alert, GCS15, CN II-XII grossly intact.   Motor grossly intact.  Psychiatric/Behavioral: interacting normally; appropriate mood/affect.    Exam: deferred    Vitals:    07/25/25 1322   BP: 146/79   BP Location: Left arm   Pulse: 76   Resp: 18   Temp: 99.3 °F (37.4 °C)   TempSrc: Temporal   SpO2: 98%     - There are no obvious limitations to social determinants of care.   - Nursing note reviewed.   - Vitals reviewed.   - Orders placed by myself and/or advanced practitioner / resident.    - Previous chart was reviewed  - No language barrier.   - History obtained from patient.    - There are no limitations to the history obtained:       Past Medical:    has a past medical history of Disease of thyroid gland.    Past Surgical:    has no past surgical history on file.    Social:     Social History     Substance and Sexual Activity   Alcohol Use Yes     Tobacco Use History[1]  Social History     Substance and Sexual Activity   Drug Use No       Echo:   No results found for this or any previous visit.    No results found for this or any previous visit.      Cath:    No results found for this or any previous visit.      Code Status: No Order  Advance Directive and Living Will:      Power of :    POLST:    Medications   Lidocaine Viscous HCl (XYLOCAINE) 2 % mucosal solution 15 mL (15 mL Swish & Swallow Given 7/25/25 1456)   dexamethasone (DECADRON) tablet 10 mg (10 mg Oral Given 7/25/25 1456)   acetaminophen (TYLENOL) tablet 650 mg (650 mg Oral Given 7/25/25 1456)   clindamycin (CLEOCIN) capsule 300 mg (300 mg Oral Given 7/25/25 1456)     No orders to display     No orders of the defined types were placed in this encounter.    Labs Reviewed - No data to display  Time reflects when diagnosis was  documented in both MDM as applicable and the Disposition within this note       Time User Action Codes Description Comment    7/25/2025  3:24 PM Peggy Ram [K12.2] Uvulitis           ED Disposition       ED Disposition   Discharge    Condition   Stable    Date/Time   Fri Jul 25, 2025  3:24 PM    Comment   Chidi Torres discharge to home/self care.                   Follow-up Information    None       Discharge Medication List as of 7/25/2025  3:37 PM        START taking these medications    Details   clindamycin (CLEOCIN) 150 mg capsule Take 2 capsules (300 mg total) by mouth every 8 (eight) hours for 10 days, Starting Fri 7/25/2025, Until Mon 8/4/2025, Normal      dexamethasone 20 mg TABS Take 10 mg by mouth 1 (one) time for 1 dose Do not start before July 27, 2025., Starting Sun 7/27/2025, Normal      Lidocaine Viscous HCl (XYLOCAINE) 2 % mucosal solution Swish and spit 15 mL 3 (three) times a day as needed for mouth pain or discomfort, Starting Fri 7/25/2025, Normal           CONTINUE these medications which have NOT CHANGED    Details   bacitracin topical ointment 500 units/g topical ointment Apply 1 large application topically 2 (two) times a day, Starting Mon 2/3/2025, Normal      ibuprofen (MOTRIN) 400 mg tablet Take 1 tablet (400 mg total) by mouth every 6 (six) hours as needed for mild pain for up to 4 days, Starting Sun 9/6/2020, Until Thu 9/10/2020, Normal      levothyroxine 50 mcg tablet Take 50 mcg by mouth daily, Historical Med      loratadine (CLARITIN) 10 mg tablet Take 1 tablet (10 mg total) by mouth daily for 7 days, Starting Sun 9/6/2020, Until Sun 9/13/2020, Normal           No discharge procedures on file.  Prior to Admission Medications   Prescriptions Last Dose Informant Patient Reported? Taking?   bacitracin topical ointment 500 units/g topical ointment   No No   Sig: Apply 1 large application topically 2 (two) times a day   ibuprofen (MOTRIN) 400 mg tablet   No No   Sig: Take 1  "tablet (400 mg total) by mouth every 6 (six) hours as needed for mild pain for up to 4 days   levothyroxine 50 mcg tablet   Yes No   Sig: Take 50 mcg by mouth daily   loratadine (CLARITIN) 10 mg tablet   No No   Sig: Take 1 tablet (10 mg total) by mouth daily for 7 days      Facility-Administered Medications: None                        Portions of the record may have been created with voice recognition software. Occasional wrong word or \"sound a like\" substitutions may have occurred due to the inherent limitations of voice recognition software. Read the chart carefully and recognize, using context, where substitutions have occurred.    Electronically signed by:  Jake Ro         [1]   Social History  Tobacco Use   Smoking Status Every Day    Current packs/day: 0.50    Types: Cigarettes   Smokeless Tobacco Former    Types: Chew     "

## 2025-07-25 NOTE — DISCHARGE INSTRUCTIONS
You were seen in the emergency room for a sore throat. You were found to have uvulitis.  You were given a dose of steroids, an antibiotic, Tylenol, and an oral lidocaine solution to help with the pain.  You are being prescribed with a dose of steroids, lidocaine solution, and antibiotics.  Please take these antibiotics for 10 days.  Please use the lidocaine solution as prescribed and not more, which is 3 times a day.  Please take the Decadron on Sunday if you continue to endorse worsening or unimproved symptoms.  You may also alternate between Tylenol and Motrin as needed for pain.  Please follow-up with your primary care doctor.  If you experience worsening symptoms, including trouble breathing, chest pain, please come back to the emergency room.